# Patient Record
Sex: MALE | Race: OTHER | RURAL
[De-identification: names, ages, dates, MRNs, and addresses within clinical notes are randomized per-mention and may not be internally consistent; named-entity substitution may affect disease eponyms.]

---

## 2022-05-05 ENCOUNTER — NEW PATIENT (OUTPATIENT)
Dept: RURAL CLINIC 2 | Facility: CLINIC | Age: 72
End: 2022-05-05

## 2022-05-05 DIAGNOSIS — H11.32: ICD-10-CM

## 2022-05-05 DIAGNOSIS — H25.813: ICD-10-CM

## 2022-05-05 PROCEDURE — 99203 OFFICE O/P NEW LOW 30 MIN: CPT

## 2022-05-05 ASSESSMENT — TONOMETRY
OD_IOP_MMHG: 22
OS_IOP_MMHG: 22

## 2022-05-05 ASSESSMENT — VISUAL ACUITY
OD_CC: 20/20
OS_CC: 20/30+3

## 2022-05-05 NOTE — PATIENT DISCUSSION
No treatment indicated. Reassurance given, increase artificial tears.  Limit eye rubbing, coughing fits, etc.

## 2023-07-26 ENCOUNTER — CONSULTATION/EVALUATION (OUTPATIENT)
Dept: RURAL CLINIC 1 | Facility: CLINIC | Age: 73
End: 2023-07-26

## 2023-07-26 DIAGNOSIS — H25.813: ICD-10-CM

## 2023-07-26 PROCEDURE — 99214 OFFICE O/P EST MOD 30 MIN: CPT

## 2023-07-26 ASSESSMENT — VISUAL ACUITY
OS_BAT: 20/40
OD_BAT: 20/40
OS_AM: 20/30+1
OD_CC: 20/30
OD_SC: 20/60
OD_PAM: 20/30+1
OD_PH: 20/25-1
OS_CC: 20/30
OS_CC: 20/20
OD_CC: 20/30
OS_SC: 20/60
OS_PH: 20/20

## 2023-07-26 ASSESSMENT — TONOMETRY
OD_IOP_MMHG: 19
OS_IOP_MMHG: 19

## 2023-08-08 ENCOUNTER — PRE-OP/H&P (OUTPATIENT)
Dept: RURAL CLINIC 1 | Facility: CLINIC | Age: 73
End: 2023-08-08

## 2023-08-08 VITALS
BODY MASS INDEX: 25.31 KG/M2 | HEART RATE: 72 BPM | DIASTOLIC BLOOD PRESSURE: 60 MMHG | HEIGHT: 68 IN | SYSTOLIC BLOOD PRESSURE: 118 MMHG | WEIGHT: 167 LBS

## 2023-08-08 DIAGNOSIS — Z79.4: ICD-10-CM

## 2023-08-08 DIAGNOSIS — E11.9: ICD-10-CM

## 2023-08-08 DIAGNOSIS — K21.9: ICD-10-CM

## 2023-08-08 DIAGNOSIS — E03.9: ICD-10-CM

## 2023-08-08 DIAGNOSIS — Z01.818: ICD-10-CM

## 2023-08-08 DIAGNOSIS — K76.9: ICD-10-CM

## 2023-08-08 PROCEDURE — 99213 OFFICE O/P EST LOW 20 MIN: CPT

## 2023-11-08 ENCOUNTER — CONSULTATION/EVALUATION (OUTPATIENT)
Dept: RURAL CLINIC 1 | Facility: CLINIC | Age: 73
End: 2023-11-08

## 2023-11-08 DIAGNOSIS — H25.813: ICD-10-CM

## 2023-11-08 PROCEDURE — 99214 OFFICE O/P EST MOD 30 MIN: CPT

## 2023-11-08 PROCEDURE — 92134 CPTRZ OPH DX IMG PST SGM RTA: CPT

## 2023-11-08 PROCEDURE — 92025 CPTRIZED CORNEAL TOPOGRAPHY: CPT

## 2023-11-08 PROCEDURE — 92136 OPHTHALMIC BIOMETRY: CPT

## 2023-11-08 ASSESSMENT — VISUAL ACUITY
OS_SC: 20/70-2
OD_CC: 20/40
OD_SC: 20/80
OS_SC: 20/40-2
OD_CC: J3-2
OD_SC: 20/70
OS_AM: 20/25
OS_CC: J3
OD_PH: 20/30-1
OS_PH: 20/40-1
OS_CC: 20/30+2
OD_PAM: 20/30

## 2023-11-08 ASSESSMENT — TONOMETRY
OS_IOP_MMHG: 17
OD_IOP_MMHG: 17

## 2024-02-07 ENCOUNTER — CONSULTATION/EVALUATION (OUTPATIENT)
Dept: RURAL CLINIC 1 | Facility: CLINIC | Age: 74
End: 2024-02-07

## 2024-02-07 DIAGNOSIS — H25.813: ICD-10-CM

## 2024-02-07 PROCEDURE — 92025 CPTRIZED CORNEAL TOPOGRAPHY: CPT

## 2024-02-07 PROCEDURE — 92134 CPTRZ OPH DX IMG PST SGM RTA: CPT

## 2024-02-07 PROCEDURE — 99214 OFFICE O/P EST MOD 30 MIN: CPT

## 2024-02-07 PROCEDURE — 92136 OPHTHALMIC BIOMETRY: CPT

## 2024-02-07 ASSESSMENT — VISUAL ACUITY
OS_AM: 20/25
OD_PAM: 20/30
OD_PH: 20/30
OU_CC: 20/30
OD_SC: 20/50
OS_CC: 20/30
OS_CC: 20/30
OU_CC: 20/30
OS_SC: 20/50-1
OD_CC: 20/40-1
OD_CC: 20/30
OU_SC: 20/40
OS_PH: 20/40

## 2024-02-07 ASSESSMENT — TONOMETRY
OD_IOP_MMHG: 16
OS_IOP_MMHG: 16

## 2024-02-26 ENCOUNTER — PRE-OP/H&P (OUTPATIENT)
Dept: RURAL CLINIC 1 | Facility: CLINIC | Age: 74
End: 2024-02-26

## 2024-02-26 VITALS
SYSTOLIC BLOOD PRESSURE: 134 MMHG | BODY MASS INDEX: 27.28 KG/M2 | HEIGHT: 68 IN | WEIGHT: 180 LBS | DIASTOLIC BLOOD PRESSURE: 78 MMHG | HEART RATE: 83 BPM

## 2024-02-26 DIAGNOSIS — M86.10: ICD-10-CM

## 2024-02-26 DIAGNOSIS — Z01.818: ICD-10-CM

## 2024-02-26 DIAGNOSIS — E03.9: ICD-10-CM

## 2024-02-26 DIAGNOSIS — I10: ICD-10-CM

## 2024-02-26 DIAGNOSIS — I48.91: ICD-10-CM

## 2024-02-26 DIAGNOSIS — Z79.4: ICD-10-CM

## 2024-02-26 DIAGNOSIS — E11.9: ICD-10-CM

## 2024-02-26 PROCEDURE — 99213 OFFICE O/P EST LOW 20 MIN: CPT

## 2024-03-11 ENCOUNTER — SURGERY/PROCEDURE (OUTPATIENT)
Dept: URBAN - METROPOLITAN AREA SURGERY 3 | Facility: SURGERY | Age: 74
End: 2024-03-11

## 2024-03-11 DIAGNOSIS — H25.11: ICD-10-CM

## 2024-03-11 PROCEDURE — 68841 INSJ RX ELUT IMPLT LAC CANAL: CPT

## 2024-03-11 PROCEDURE — 66984 XCAPSL CTRC RMVL W/O ECP: CPT

## 2024-03-12 ENCOUNTER — POST-OP (OUTPATIENT)
Dept: RURAL CLINIC 2 | Facility: CLINIC | Age: 74
End: 2024-03-12

## 2024-03-12 DIAGNOSIS — Z96.1: ICD-10-CM

## 2024-03-12 PROCEDURE — 99024 POSTOP FOLLOW-UP VISIT: CPT

## 2024-03-12 ASSESSMENT — TONOMETRY: OD_IOP_MMHG: 22

## 2024-03-12 ASSESSMENT — VISUAL ACUITY: OD_SC: 20/70

## 2024-03-20 ENCOUNTER — POST OP/EVAL OF SECOND EYE (OUTPATIENT)
Dept: RURAL CLINIC 2 | Facility: CLINIC | Age: 74
End: 2024-03-20

## 2024-03-20 DIAGNOSIS — Z96.1: ICD-10-CM

## 2024-03-20 PROCEDURE — 99024 POSTOP FOLLOW-UP VISIT: CPT

## 2024-03-20 ASSESSMENT — VISUAL ACUITY
OD_SC: 20/30
OS_CC: 20/40
OS_SC: 20/60

## 2024-03-20 ASSESSMENT — TONOMETRY
OS_IOP_MMHG: 17
OD_IOP_MMHG: 18

## 2024-03-25 ENCOUNTER — SURGERY/PROCEDURE (OUTPATIENT)
Dept: URBAN - METROPOLITAN AREA SURGERY 3 | Facility: SURGERY | Age: 74
End: 2024-03-25

## 2024-03-25 DIAGNOSIS — H25.12: ICD-10-CM

## 2024-03-25 PROCEDURE — 66984 XCAPSL CTRC RMVL W/O ECP: CPT

## 2024-03-25 PROCEDURE — 68841 INSJ RX ELUT IMPLT LAC CANAL: CPT

## 2024-03-26 ENCOUNTER — POST-OP (OUTPATIENT)
Dept: RURAL CLINIC 2 | Facility: CLINIC | Age: 74
End: 2024-03-26

## 2024-03-26 DIAGNOSIS — Z96.1: ICD-10-CM

## 2024-03-26 PROCEDURE — 99024 POSTOP FOLLOW-UP VISIT: CPT

## 2024-03-26 ASSESSMENT — VISUAL ACUITY
OD_PH: 20/30
OS_SC: 20/40
OS_PH: 20/30
OD_SC: 20/40

## 2024-03-26 ASSESSMENT — TONOMETRY
OS_IOP_MMHG: 27
OD_IOP_MMHG: 17

## 2024-04-01 ENCOUNTER — POST-OP (OUTPATIENT)
Dept: RURAL CLINIC 2 | Facility: CLINIC | Age: 74
End: 2024-04-01

## 2024-04-01 DIAGNOSIS — Z96.1: ICD-10-CM

## 2024-04-01 PROCEDURE — 99024 POSTOP FOLLOW-UP VISIT: CPT

## 2024-04-01 ASSESSMENT — TONOMETRY
OS_IOP_MMHG: 23
OD_IOP_MMHG: 21

## 2024-04-01 ASSESSMENT — VISUAL ACUITY
OS_SC: 20/30
OD_SC: 20/30+

## 2024-04-22 ENCOUNTER — POST-OP (OUTPATIENT)
Dept: RURAL CLINIC 2 | Facility: CLINIC | Age: 74
End: 2024-04-22

## 2024-04-22 DIAGNOSIS — Z96.1: ICD-10-CM

## 2024-04-22 PROCEDURE — 99024 POSTOP FOLLOW-UP VISIT: CPT

## 2024-04-22 ASSESSMENT — TONOMETRY
OD_IOP_MMHG: 17
OS_IOP_MMHG: 18

## 2024-04-22 ASSESSMENT — VISUAL ACUITY
OD_SC: 20/25
OS_SC: 20/30+2

## 2024-05-09 ENCOUNTER — EMERGENCY VISIT (OUTPATIENT)
Dept: RURAL CLINIC 2 | Facility: CLINIC | Age: 74
End: 2024-05-09

## 2024-05-09 DIAGNOSIS — H11.823: ICD-10-CM

## 2024-05-09 DIAGNOSIS — T15.12XA: ICD-10-CM

## 2024-05-09 PROCEDURE — 99213 OFFICE O/P EST LOW 20 MIN: CPT | Mod: 24

## 2024-05-09 ASSESSMENT — TONOMETRY
OD_IOP_MMHG: 17
OS_IOP_MMHG: 17

## 2024-05-09 ASSESSMENT — VISUAL ACUITY
OD_CC: 20/25
OS_CC: 20/25

## 2024-06-12 ENCOUNTER — EMERGENCY VISIT (OUTPATIENT)
Dept: RURAL CLINIC 2 | Facility: CLINIC | Age: 74
End: 2024-06-12

## 2024-06-12 DIAGNOSIS — E11.9: ICD-10-CM

## 2024-06-12 DIAGNOSIS — H10.403: ICD-10-CM

## 2024-06-12 DIAGNOSIS — H11.823: ICD-10-CM

## 2024-06-12 PROCEDURE — 99213 OFFICE O/P EST LOW 20 MIN: CPT | Mod: 24

## 2024-06-12 ASSESSMENT — TONOMETRY
OS_IOP_MMHG: 16
OD_IOP_MMHG: 17

## 2024-06-12 ASSESSMENT — VISUAL ACUITY
OD_CC: 20/25
OS_CC: 20/25